# Patient Record
Sex: MALE | Race: WHITE | Employment: OTHER | URBAN - METROPOLITAN AREA
[De-identification: names, ages, dates, MRNs, and addresses within clinical notes are randomized per-mention and may not be internally consistent; named-entity substitution may affect disease eponyms.]

---

## 2017-12-18 PROCEDURE — 88305 TISSUE EXAM BY PATHOLOGIST: CPT | Performed by: INTERNAL MEDICINE

## 2022-02-23 ENCOUNTER — TELEPHONE (OUTPATIENT)
Dept: ORTHOPEDICS CLINIC | Facility: CLINIC | Age: 67
End: 2022-02-23

## 2022-02-23 NOTE — TELEPHONE ENCOUNTER
Left Knee xrays ordered. Verified voicemail reached. Attempted to call [address] regarding request for call back to our office. Reached voicemail, left voicemail and provided a detail message with my call back contact information.

## 2022-02-23 NOTE — TELEPHONE ENCOUNTER
Patient coming in for Left knee pain . Patient has not had imaging, if imaging needed please place Rx.   Future Appointments   Date Time Provider Concha Cormier   3/30/2022 10:20 AM Kelli Villanueva MD [facility]

## 2022-03-30 ENCOUNTER — OFFICE VISIT (OUTPATIENT)
Dept: ORTHOPEDICS CLINIC | Facility: CLINIC | Age: 67
End: 2022-03-30
Payer: MEDICARE

## 2022-03-30 ENCOUNTER — HOSPITAL ENCOUNTER (OUTPATIENT)
Dept: GENERAL RADIOLOGY | Age: 67
Discharge: HOME | End: 2022-03-30
Attending: ORTHOPAEDIC SURGERY
Payer: MEDICARE

## 2022-03-30 VITALS — BODY MASS INDEX: 32.21 KG/M2 | HEIGHT: 70 IN | WEIGHT: 225 LBS

## 2022-03-30 DIAGNOSIS — M25.562 LEFT KNEE PAIN, UNSPECIFIED CHRONICITY: ICD-10-CM

## 2022-03-30 DIAGNOSIS — M65.9 SYNOVITIS OF KNEE: ICD-10-CM

## 2022-03-30 DIAGNOSIS — Z01.89 ENCOUNTER FOR LOWER EXTREMITY COMPARISON IMAGING STUDY: ICD-10-CM

## 2022-03-30 DIAGNOSIS — M23.307 DEGENERATIVE TEAR OF MENISCUS OF LEFT KNEE: Primary | ICD-10-CM

## 2022-03-30 PROCEDURE — 73564 X-RAY EXAM KNEE 4 OR MORE: CPT | Performed by: ORTHOPAEDIC SURGERY

## 2022-03-30 PROCEDURE — 99203 OFFICE O/P NEW LOW 30 MIN: CPT | Performed by: ORTHOPAEDIC SURGERY

## 2022-03-30 PROCEDURE — 73562 X-RAY EXAM OF KNEE 3: CPT | Performed by: ORTHOPAEDIC SURGERY

## 2022-03-30 RX ORDER — SIMVASTATIN 20 MG
TABLET ORAL
COMMUNITY
Start: 2022-01-06

## 2022-03-30 RX ORDER — FLUTICASONE PROPIONATE 50 MCG
1 SPRAY, SUSPENSION (ML) NASAL DAILY
COMMUNITY
Start: 2020-01-27

## 2022-04-04 NOTE — PROGRESS NOTES
Patient is a 68-year-old white male here for evaluation of his left knee. Patient is concerned that he has degenerative arthritis of the knee. The patient's problem has been going on for several months if not longer. No particular trauma. Patient states the pain is predominantly along the inside of his left knee. Patient states that the pain can be abrupt but oftentimes is more of an achiness longer has been walking. Not much pain of the right knee noted. Patient does note some swelling at times. Patient has taken some over-the-counter medications with limited benefit. Patient is exam shows have a nonantalgic gait. Exam of his left knee shows that moderate tenderness on the medial aspect of the knee. Jon test is plus minus. Collateral ligaments are stable. Lachman's negative. Range of motion of the knee is near full. Neurologically intact to light touch left lower extremity. Motor exam grossly 5/5. Patient's x-rays show well-maintained joint spaces. He does have an ossific density in the posterior aspect of the left knee slightly to the medial aspect of the knee. Impression is that of meniscal tear medially of the left knee. Second impression is that of possible loose body of the left knee. Recommendations are to go ahead with an MRI scan of the left knee. We will have him follow-up with us after that is completed. I also advised that we can consider a cortisone injection if he so desires. Follow-up after the MRI scans completed.

## (undated) DIAGNOSIS — Z01.89 ENCOUNTER FOR LOWER EXTREMITY COMPARISON IMAGING STUDY: ICD-10-CM

## (undated) DIAGNOSIS — M25.562 LEFT KNEE PAIN, UNSPECIFIED CHRONICITY: Primary | ICD-10-CM